# Patient Record
Sex: FEMALE | Race: WHITE
[De-identification: names, ages, dates, MRNs, and addresses within clinical notes are randomized per-mention and may not be internally consistent; named-entity substitution may affect disease eponyms.]

---

## 2019-05-24 ENCOUNTER — RESULT REVIEW (OUTPATIENT)
Age: 69
End: 2019-05-24

## 2020-12-30 ENCOUNTER — TRANSCRIPTION ENCOUNTER (OUTPATIENT)
Age: 70
End: 2020-12-30

## 2021-08-13 ENCOUNTER — RESULT REVIEW (OUTPATIENT)
Age: 71
End: 2021-08-13

## 2021-10-18 PROBLEM — Z00.00 ENCOUNTER FOR PREVENTIVE HEALTH EXAMINATION: Status: ACTIVE | Noted: 2021-10-18

## 2021-10-19 ENCOUNTER — APPOINTMENT (OUTPATIENT)
Dept: OTOLARYNGOLOGY | Facility: CLINIC | Age: 71
End: 2021-10-19
Payer: MEDICARE

## 2021-10-19 VITALS
BODY MASS INDEX: 30.73 KG/M2 | DIASTOLIC BLOOD PRESSURE: 84 MMHG | HEART RATE: 82 BPM | SYSTOLIC BLOOD PRESSURE: 149 MMHG | WEIGHT: 180 LBS | HEIGHT: 64 IN

## 2021-10-19 DIAGNOSIS — F17.200 NICOTINE DEPENDENCE, UNSPECIFIED, UNCOMPLICATED: ICD-10-CM

## 2021-10-19 DIAGNOSIS — Z78.9 OTHER SPECIFIED HEALTH STATUS: ICD-10-CM

## 2021-10-19 PROCEDURE — 99213 OFFICE O/P EST LOW 20 MIN: CPT | Mod: 25

## 2021-10-19 PROCEDURE — 69210 REMOVE IMPACTED EAR WAX UNI: CPT

## 2021-10-19 NOTE — HISTORY OF PRESENT ILLNESS
[de-identified] : Eleanor Lilly is a 71 yo female who presents with left ear fullness. She has previously been seen here for cerumen impaction. She notes diminished subjective hearing from the left. She denies otalgia, otorrhea, tinnitus, vertigo, or recent ear infections. She denies fevers or chills. She denies nasal congestion, rhinorrhea, or postnasal drainage.

## 2021-10-19 NOTE — PHYSICAL EXAM
[de-identified] : Bilateral cerumen impaction (see procedure). Once cerumen removed, bilateral EAC wnl [] : septum deviated to the left [Midline] : trachea located in midline position [Normal] : no rashes

## 2021-10-19 NOTE — ASSESSMENT
[FreeTextEntry1] : Eleanor Lilly presents with subjective diminished hearing from the left ear in the setting of bilateral cerumen impaction. Once cerumen was removed, hearing returned back to baseline and aural fullness resolved.\par \par - Follow up as needed.

## 2022-03-29 ENCOUNTER — APPOINTMENT (OUTPATIENT)
Dept: OTOLARYNGOLOGY | Facility: CLINIC | Age: 72
End: 2022-03-29
Payer: MEDICARE

## 2022-03-29 VITALS
SYSTOLIC BLOOD PRESSURE: 158 MMHG | HEIGHT: 64 IN | DIASTOLIC BLOOD PRESSURE: 93 MMHG | BODY MASS INDEX: 31.58 KG/M2 | WEIGHT: 185 LBS | HEART RATE: 91 BPM

## 2022-03-29 DIAGNOSIS — H93.292 OTHER ABNORMAL AUDITORY PERCEPTIONS, LEFT EAR: ICD-10-CM

## 2022-03-29 PROCEDURE — 99212 OFFICE O/P EST SF 10 MIN: CPT | Mod: 25

## 2022-03-29 PROCEDURE — G0268 REMOVAL OF IMPACTED WAX MD: CPT

## 2022-03-29 NOTE — HISTORY OF PRESENT ILLNESS
[de-identified] : Eleanor Lilly is a 71 yo female who presents with left ear fullness. She has previously been seen here for cerumen impaction. She notes diminished subjective hearing from the left. She denies otalgia, otorrhea, tinnitus, vertigo, or recent ear infections. She denies fevers or chills. She denies nasal congestion, rhinorrhea, or postnasal drainage. [FreeTextEntry1] : 3/29/22 -  Ms. Lilly presents for follow-up. She notes that her left ear feels full and has some diminished hearing. She denies otalgia, otorrhea, tinnitus, vertigo, or recent ear infections. She denies fevers or chills.

## 2022-03-29 NOTE — ASSESSMENT
[FreeTextEntry1] : Eleanor Lilly presents for evaluation of left aural fullness and diminished hearing. She was found to have bilateral cerumen impaction which was removed. She notes return of hearing to baselien and resolution of fullness. Offered audiogram, but patient would like to wait another three months prior to obtaining this.\par \par In addition, she has a dark subcentimeter lesion of her central lower lip. She states that she is seeing a dermatologist for this soon.\par \par Follow up in 3 months with audiogram at that time.

## 2022-03-29 NOTE — PHYSICAL EXAM
[de-identified] : Bilateral cerumen impaction (see procedure). Once cerumen removed, bilateral EAC wnl [] : septum deviated to the left [Midline] : trachea located in midline position [de-identified] : Dark subcentimeter mole of central lower lip [Normal] : no rashes

## 2022-06-24 ENCOUNTER — APPOINTMENT (OUTPATIENT)
Dept: OTOLARYNGOLOGY | Facility: CLINIC | Age: 72
End: 2022-06-24
Payer: MEDICARE

## 2022-06-24 VITALS
BODY MASS INDEX: 29.02 KG/M2 | HEART RATE: 84 BPM | HEIGHT: 64 IN | DIASTOLIC BLOOD PRESSURE: 82 MMHG | WEIGHT: 170 LBS | SYSTOLIC BLOOD PRESSURE: 146 MMHG

## 2022-06-24 DIAGNOSIS — H93.293 OTHER ABNORMAL AUDITORY PERCEPTIONS, BILATERAL: ICD-10-CM

## 2022-06-24 PROCEDURE — 92567 TYMPANOMETRY: CPT

## 2022-06-24 PROCEDURE — 92557 COMPREHENSIVE HEARING TEST: CPT

## 2022-06-24 PROCEDURE — 99213 OFFICE O/P EST LOW 20 MIN: CPT

## 2022-06-24 NOTE — DATA REVIEWED
[de-identified] : Type  A tymps AU\par WNL, 250-2000 Hz, mild SNHL, 6107-2272 Hz, AU\par REC: ENT f/u, re-eval per MD

## 2022-06-24 NOTE — PHYSICAL EXAM
[] : septum deviated to the left [Midline] : trachea located in midline position [Normal] : no rashes [de-identified] : Mild bilateral cerumen removed. [de-identified] : Dark subcentimeter mole of central lower lip

## 2022-06-24 NOTE — HISTORY OF PRESENT ILLNESS
[de-identified] : Eleanor Lilly is a 71 yo female who presents with left ear fullness. She has previously been seen here for cerumen impaction. She notes diminished subjective hearing from the left. She denies otalgia, otorrhea, tinnitus, vertigo, or recent ear infections. She denies fevers or chills. She denies nasal congestion, rhinorrhea, or postnasal drainage. [FreeTextEntry1] : 3/29/22 -  Ms. Lilly presents for follow-up. She notes that her left ear feels full and has some diminished hearing. She denies otalgia, otorrhea, tinnitus, vertigo, or recent ear infections. She denies fevers or chills.\par \par 6/24/22 - Ms. Lilly presents for follow-up. She notes mild left aural fullness. She notes that her  believes she speaks loudly and there is some concern for hearing loss. She denies tinnitus or vertigo. She denies facial weakness, facial numbness, or recurrent ear infections. She denies recent fevers or chills. She denies family history of early onset hearing loss or occupational noise exposure. She denies exposure to ototoxic medications. She has seen a dermatologist for her lower lip discolored spot, who will be monitoring it but does not believe it requires further intervention.

## 2022-09-23 ENCOUNTER — APPOINTMENT (OUTPATIENT)
Dept: OTOLARYNGOLOGY | Facility: CLINIC | Age: 72
End: 2022-09-23

## 2022-09-23 VITALS
HEART RATE: 76 BPM | WEIGHT: 180 LBS | DIASTOLIC BLOOD PRESSURE: 83 MMHG | HEIGHT: 63 IN | BODY MASS INDEX: 31.89 KG/M2 | SYSTOLIC BLOOD PRESSURE: 126 MMHG

## 2022-09-23 PROCEDURE — G0268 REMOVAL OF IMPACTED WAX MD: CPT

## 2022-09-23 PROCEDURE — 99212 OFFICE O/P EST SF 10 MIN: CPT | Mod: 25

## 2022-09-23 NOTE — ASSESSMENT
[FreeTextEntry1] : Eleanor Lilly presents for follow-up. She has bilateral cerumen impaction which was removed. She has known mild SNHL AU seen on previous audiogram. Will repeat audio in 9 months.\par \par - Follow up in 3 months for cerumen removal.

## 2022-09-23 NOTE — HISTORY OF PRESENT ILLNESS
[de-identified] : Eleanor Lilly is a 69 yo female who presents with left ear fullness. She has previously been seen here for cerumen impaction. She notes diminished subjective hearing from the left. She denies otalgia, otorrhea, tinnitus, vertigo, or recent ear infections. She denies fevers or chills. She denies nasal congestion, rhinorrhea, or postnasal drainage. [FreeTextEntry1] : 3/29/22 -  Ms. Lilly presents for follow-up. She notes that her left ear feels full and has some diminished hearing. She denies otalgia, otorrhea, tinnitus, vertigo, or recent ear infections. She denies fevers or chills.\par \par 6/24/22 - Ms. Lilly presents for follow-up. She notes mild left aural fullness. She notes that her  believes she speaks loudly and there is some concern for hearing loss. She denies tinnitus or vertigo. She denies facial weakness, facial numbness, or recurrent ear infections. She denies recent fevers or chills. She denies family history of early onset hearing loss or occupational noise exposure. She denies exposure to ototoxic medications. She has seen a dermatologist for her lower lip discolored spot, who will be monitoring it but does not believe it requires further intervention.\par \par 9/23/22 - Eleanor Lilly presents for follow-up. She has mild SNHL AU seen on audio at last visit. She denies otalgia, otorrhea, tinnitus, vertigo, or hearing change. She denies fevers, chills, or recent ear infections.

## 2022-09-23 NOTE — PHYSICAL EXAM
[de-identified] : Bilateral cerumen impaction [] : septum deviated to the left [Midline] : trachea located in midline position [de-identified] : Dark subcentimeter mole of central lower lip [Normal] : no rashes

## 2022-10-19 ENCOUNTER — NON-APPOINTMENT (OUTPATIENT)
Age: 72
End: 2022-10-19

## 2022-10-19 DIAGNOSIS — Z82.49 FAMILY HISTORY OF ISCHEMIC HEART DISEASE AND OTHER DISEASES OF THE CIRCULATORY SYSTEM: ICD-10-CM

## 2022-10-19 DIAGNOSIS — G43.909 MIGRAINE, UNSPECIFIED, NOT INTRACTABLE, W/OUT STATUS MIGRAINOSUS: ICD-10-CM

## 2022-10-19 DIAGNOSIS — Z92.89 PERSONAL HISTORY OF OTHER MEDICAL TREATMENT: ICD-10-CM

## 2022-10-19 DIAGNOSIS — Z86.69 PERSONAL HISTORY OF OTHER DISEASES OF THE NERVOUS SYSTEM AND SENSE ORGANS: ICD-10-CM

## 2022-10-19 RX ORDER — MULTIVITAMIN
TABLET ORAL
Refills: 0 | Status: ACTIVE | COMMUNITY

## 2022-12-12 ENCOUNTER — APPOINTMENT (OUTPATIENT)
Dept: CARDIOLOGY | Facility: CLINIC | Age: 72
End: 2022-12-12

## 2022-12-12 ENCOUNTER — TRANSCRIPTION ENCOUNTER (OUTPATIENT)
Age: 72
End: 2022-12-12

## 2022-12-12 VITALS
SYSTOLIC BLOOD PRESSURE: 138 MMHG | WEIGHT: 170 LBS | HEART RATE: 81 BPM | DIASTOLIC BLOOD PRESSURE: 83 MMHG | OXYGEN SATURATION: 98 % | HEIGHT: 63 IN | TEMPERATURE: 98.9 F | BODY MASS INDEX: 30.12 KG/M2

## 2022-12-12 PROCEDURE — 99213 OFFICE O/P EST LOW 20 MIN: CPT

## 2022-12-12 NOTE — ASSESSMENT
[FreeTextEntry1] : 72 years old female with hypertension dyslipidemia comes here for follow-up. patient's medications reviewed patient will continue previous medication no changes are made. patient will have blood work done next visit

## 2022-12-16 ENCOUNTER — APPOINTMENT (OUTPATIENT)
Dept: OTOLARYNGOLOGY | Facility: CLINIC | Age: 72
End: 2022-12-16

## 2022-12-16 VITALS
BODY MASS INDEX: 29.02 KG/M2 | HEIGHT: 64 IN | WEIGHT: 170 LBS | SYSTOLIC BLOOD PRESSURE: 137 MMHG | DIASTOLIC BLOOD PRESSURE: 86 MMHG | HEART RATE: 81 BPM

## 2022-12-16 DIAGNOSIS — H61.22 IMPACTED CERUMEN, LEFT EAR: ICD-10-CM

## 2022-12-16 PROCEDURE — 99212 OFFICE O/P EST SF 10 MIN: CPT | Mod: 25

## 2022-12-16 PROCEDURE — 69210 REMOVE IMPACTED EAR WAX UNI: CPT

## 2022-12-16 NOTE — HISTORY OF PRESENT ILLNESS
[de-identified] : Eleanor Lilly is a 69 yo female who presents with left ear fullness. She has previously been seen here for cerumen impaction. She notes diminished subjective hearing from the left. She denies otalgia, otorrhea, tinnitus, vertigo, or recent ear infections. She denies fevers or chills. She denies nasal congestion, rhinorrhea, or postnasal drainage. [FreeTextEntry1] : 3/29/22 -  Ms. Lilly presents for follow-up. She notes that her left ear feels full and has some diminished hearing. She denies otalgia, otorrhea, tinnitus, vertigo, or recent ear infections. She denies fevers or chills.\par \par 6/24/22 - Ms. Lilly presents for follow-up. She notes mild left aural fullness. She notes that her  believes she speaks loudly and there is some concern for hearing loss. She denies tinnitus or vertigo. She denies facial weakness, facial numbness, or recurrent ear infections. She denies recent fevers or chills. She denies family history of early onset hearing loss or occupational noise exposure. She denies exposure to ototoxic medications. She has seen a dermatologist for her lower lip discolored spot, who will be monitoring it but does not believe it requires further intervention.\par \par 9/23/22 - Eleanor Lilly presents for follow-up. She has mild SNHL AU seen on audio at last visit. She denies otalgia, otorrhea, tinnitus, vertigo, or hearing change. She denies fevers, chills, or recent ear infections.\par \par 12/16/22 - Ms. Lilly presents for follow-up. She has left aural fullness. She denies otalgia, otorrhea, tinnitus, vertigo. She denies hearing change. She denies fevers, chills, or recent ear infections. She has known bilateral sensorineural hearing loss.

## 2022-12-16 NOTE — ASSESSMENT
[FreeTextEntry1] : Eleanor Lilly presents for follow-up. She has left cerumen impaction which was removed. She has known mild SNHL AU seen on previous audiogram. Will repeat audio in 6 months.\par \par - Follow up in 6 months with audio at that time.

## 2022-12-16 NOTE — PHYSICAL EXAM
[de-identified] : Left cerumen impaction. [] : septum deviated to the left [Midline] : trachea located in midline position [de-identified] : Dark subcentimeter mole of central lower lip, stable. [Normal] : no rashes

## 2023-03-13 ENCOUNTER — APPOINTMENT (OUTPATIENT)
Dept: CARDIOLOGY | Facility: CLINIC | Age: 73
End: 2023-03-13
Payer: MEDICARE

## 2023-03-13 VITALS
HEART RATE: 69 BPM | TEMPERATURE: 97.5 F | DIASTOLIC BLOOD PRESSURE: 90 MMHG | WEIGHT: 168 LBS | SYSTOLIC BLOOD PRESSURE: 160 MMHG | HEIGHT: 63 IN | OXYGEN SATURATION: 94 % | BODY MASS INDEX: 29.77 KG/M2

## 2023-03-13 PROCEDURE — 36415 COLL VENOUS BLD VENIPUNCTURE: CPT

## 2023-03-13 PROCEDURE — 99213 OFFICE O/P EST LOW 20 MIN: CPT | Mod: 25

## 2023-03-13 RX ORDER — ALENDRONATE SODIUM 70 MG/1
70 TABLET ORAL
Qty: 12 | Refills: 0 | Status: DISCONTINUED | COMMUNITY
Start: 2022-03-24 | End: 2023-03-13

## 2023-03-13 RX ORDER — TOPIRAMATE 50 MG/1
50 TABLET, FILM COATED ORAL
Qty: 180 | Refills: 0 | Status: DISCONTINUED | COMMUNITY
Start: 2021-08-31 | End: 2023-03-13

## 2023-03-13 NOTE — ASSESSMENT
[FreeTextEntry1] : Patient is noted to have mild hypertension–BP taken by me was 150/90-if BP remains elevated patient may need further adjustment of antihypertensive medication.  This was all discussed with the patient. patient will have lipids liver function CPK drawn today

## 2023-03-13 NOTE — REASON FOR VISIT
[Hyperlipidemia] : hyperlipidemia [Hypertension] : hypertension [FreeTextEntry1] : 72 years old female with hypertension, dyslipidemia mild aortic stenosis comes to the office for routine follow-up.  Denies any dizziness, no syncope

## 2023-03-16 LAB
ALBUMIN SERPL ELPH-MCNC: 5.1 G/DL
ALP BLD-CCNC: 80 U/L
ALT SERPL-CCNC: 14 U/L
AST SERPL-CCNC: 20 U/L
BILIRUB DIRECT SERPL-MCNC: 0.2 MG/DL
BILIRUB INDIRECT SERPL-MCNC: 0.3 MG/DL
BILIRUB SERPL-MCNC: 0.5 MG/DL
CHOLEST SERPL-MCNC: 195 MG/DL
CK SERPL-CCNC: 91 U/L
HDLC SERPL-MCNC: 95 MG/DL
LDLC SERPL CALC-MCNC: 83 MG/DL
NONHDLC SERPL-MCNC: 99 MG/DL
PROT SERPL-MCNC: 7.2 G/DL
TRIGL SERPL-MCNC: 78 MG/DL

## 2023-03-17 ENCOUNTER — NON-APPOINTMENT (OUTPATIENT)
Age: 73
End: 2023-03-17

## 2023-05-05 ENCOUNTER — RX RENEWAL (OUTPATIENT)
Age: 73
End: 2023-05-05

## 2023-06-05 ENCOUNTER — APPOINTMENT (OUTPATIENT)
Dept: CARDIOLOGY | Facility: CLINIC | Age: 73
End: 2023-06-05
Payer: MEDICARE

## 2023-06-05 VITALS
HEART RATE: 68 BPM | DIASTOLIC BLOOD PRESSURE: 83 MMHG | HEIGHT: 63 IN | SYSTOLIC BLOOD PRESSURE: 130 MMHG | WEIGHT: 156.5 LBS | BODY MASS INDEX: 27.73 KG/M2 | OXYGEN SATURATION: 95 %

## 2023-06-05 PROCEDURE — 99213 OFFICE O/P EST LOW 20 MIN: CPT

## 2023-06-05 NOTE — REASON FOR VISIT
[Hyperlipidemia] : hyperlipidemia [Hypertension] : hypertension [FreeTextEntry1] : 72 years old female with hypertension dyslipidemia comes to the office for routine follow-up

## 2023-06-05 NOTE — ASSESSMENT
[FreeTextEntry1] : Patient's medications reviewed. patient will continue previous medications no changes are made

## 2023-06-16 ENCOUNTER — APPOINTMENT (OUTPATIENT)
Dept: OTOLARYNGOLOGY | Facility: CLINIC | Age: 73
End: 2023-06-16
Payer: MEDICARE

## 2023-06-16 VITALS
DIASTOLIC BLOOD PRESSURE: 85 MMHG | HEART RATE: 79 BPM | BODY MASS INDEX: 27.46 KG/M2 | WEIGHT: 155 LBS | SYSTOLIC BLOOD PRESSURE: 144 MMHG | HEIGHT: 63 IN

## 2023-06-16 PROCEDURE — 99213 OFFICE O/P EST LOW 20 MIN: CPT | Mod: 25

## 2023-06-16 PROCEDURE — 92557 COMPREHENSIVE HEARING TEST: CPT

## 2023-06-16 PROCEDURE — 92567 TYMPANOMETRY: CPT

## 2023-06-16 PROCEDURE — G0268 REMOVAL OF IMPACTED WAX MD: CPT

## 2023-06-16 NOTE — PHYSICAL EXAM
[] : septum deviated to the left [Midline] : trachea located in midline position [Normal] : no rashes [de-identified] : Bilateral cerumen impaction [de-identified] : Dark subcentimeter mole of central lower lip, stable.

## 2023-06-16 NOTE — HISTORY OF PRESENT ILLNESS
[de-identified] : Eleanor Lilly is a 71 yo female who presents with left ear fullness. She has previously been seen here for cerumen impaction. She notes diminished subjective hearing from the left. She denies otalgia, otorrhea, tinnitus, vertigo, or recent ear infections. She denies fevers or chills. She denies nasal congestion, rhinorrhea, or postnasal drainage. [FreeTextEntry1] : 3/29/22 -  Ms. Lilly presents for follow-up. She notes that her left ear feels full and has some diminished hearing. She denies otalgia, otorrhea, tinnitus, vertigo, or recent ear infections. She denies fevers or chills.\par \par 6/24/22 - Ms. Lilly presents for follow-up. She notes mild left aural fullness. She notes that her  believes she speaks loudly and there is some concern for hearing loss. She denies tinnitus or vertigo. She denies facial weakness, facial numbness, or recurrent ear infections. She denies recent fevers or chills. She denies family history of early onset hearing loss or occupational noise exposure. She denies exposure to ototoxic medications. She has seen a dermatologist for her lower lip discolored spot, who will be monitoring it but does not believe it requires further intervention.\par \par 9/23/22 - Eleanor Lilly presents for follow-up. She has mild SNHL AU seen on audio at last visit. She denies otalgia, otorrhea, tinnitus, vertigo, or hearing change. She denies fevers, chills, or recent ear infections.\par \par 12/16/22 - Ms. Lilly presents for follow-up. She has left aural fullness. She denies otalgia, otorrhea, tinnitus, vertigo. She denies hearing change. She denies fevers, chills, or recent ear infections. She has known bilateral sensorineural hearing loss.\par \par 6/16/23 - Eleanor Lilly presents for follow-up. She denies change in hearing. She denies tinnitus, vertigo, otalgia, otorrhea, or recent eari nfections. She denies fevers or chills. Patient saw dermatologist and notes that everything was normal.

## 2023-06-16 NOTE — ASSESSMENT
[FreeTextEntry1] : Eleanor Lilly presents for follow-up. Bilateral cerumen impaction was removed today. Audiogram was performed and reviewed showing type A tymps AU, and normal sloping to mild high frequency hearing loss AU. Will continue to monitor hearing. \par \par - Follow up in 1 year with audio.

## 2023-08-28 ENCOUNTER — RX RENEWAL (OUTPATIENT)
Age: 73
End: 2023-08-28

## 2023-09-22 ENCOUNTER — APPOINTMENT (OUTPATIENT)
Dept: CARDIOLOGY | Facility: CLINIC | Age: 73
End: 2023-09-22
Payer: MEDICARE

## 2023-09-22 VITALS
BODY MASS INDEX: 26.58 KG/M2 | WEIGHT: 150 LBS | SYSTOLIC BLOOD PRESSURE: 159 MMHG | HEIGHT: 63 IN | HEART RATE: 78 BPM | DIASTOLIC BLOOD PRESSURE: 85 MMHG | TEMPERATURE: 98.5 F | RESPIRATION RATE: 18 BRPM | OXYGEN SATURATION: 96 %

## 2023-09-22 PROCEDURE — 99213 OFFICE O/P EST LOW 20 MIN: CPT

## 2023-09-22 RX ORDER — ATORVASTATIN CALCIUM 80 MG/1
TABLET, FILM COATED ORAL
Refills: 0 | Status: DISCONTINUED | COMMUNITY
End: 2023-09-22

## 2023-09-22 RX ORDER — LOSARTAN POTASSIUM 100 MG/1
100 TABLET, FILM COATED ORAL DAILY
Qty: 90 | Refills: 1 | Status: DISCONTINUED | COMMUNITY
End: 2023-09-22

## 2023-09-27 LAB
ALBUMIN SERPL ELPH-MCNC: 4.9 G/DL
ALP BLD-CCNC: 87 U/L
ALT SERPL-CCNC: 15 U/L
AST SERPL-CCNC: 19 U/L
BILIRUB DIRECT SERPL-MCNC: 0.2 MG/DL
BILIRUB INDIRECT SERPL-MCNC: 0.6 MG/DL
BILIRUB SERPL-MCNC: 0.8 MG/DL
CHOLEST SERPL-MCNC: 205 MG/DL
CK SERPL-CCNC: 105 U/L
HDLC SERPL-MCNC: 95 MG/DL
LDLC SERPL CALC-MCNC: 92 MG/DL
NONHDLC SERPL-MCNC: 110 MG/DL
PROT SERPL-MCNC: 7.1 G/DL
TRIGL SERPL-MCNC: 104 MG/DL

## 2023-12-01 ENCOUNTER — APPOINTMENT (OUTPATIENT)
Dept: CARDIOLOGY | Facility: CLINIC | Age: 73
End: 2023-12-01
Payer: MEDICARE

## 2023-12-01 VITALS
BODY MASS INDEX: 27.46 KG/M2 | HEART RATE: 75 BPM | SYSTOLIC BLOOD PRESSURE: 148 MMHG | DIASTOLIC BLOOD PRESSURE: 83 MMHG | HEIGHT: 63 IN | TEMPERATURE: 99.3 F | WEIGHT: 155 LBS | OXYGEN SATURATION: 98 %

## 2023-12-01 DIAGNOSIS — J40 BRONCHITIS, NOT SPECIFIED AS ACUTE OR CHRONIC: ICD-10-CM

## 2023-12-01 PROCEDURE — 99213 OFFICE O/P EST LOW 20 MIN: CPT

## 2023-12-04 ENCOUNTER — RX RENEWAL (OUTPATIENT)
Age: 73
End: 2023-12-04

## 2023-12-22 ENCOUNTER — APPOINTMENT (OUTPATIENT)
Dept: CARDIOLOGY | Facility: CLINIC | Age: 73
End: 2023-12-22
Payer: MEDICARE

## 2023-12-22 VITALS
DIASTOLIC BLOOD PRESSURE: 86 MMHG | HEART RATE: 86 BPM | TEMPERATURE: 98.6 F | HEIGHT: 63 IN | OXYGEN SATURATION: 98 % | BODY MASS INDEX: 27.46 KG/M2 | SYSTOLIC BLOOD PRESSURE: 138 MMHG | WEIGHT: 155 LBS

## 2023-12-22 PROCEDURE — 36415 COLL VENOUS BLD VENIPUNCTURE: CPT

## 2023-12-22 PROCEDURE — 99213 OFFICE O/P EST LOW 20 MIN: CPT | Mod: 25

## 2023-12-22 NOTE — ASSESSMENT
[FreeTextEntry1] : Patient's medications reviewed patient will continue present medication no changes are made.  Patient had lipids liver function CPK drawn today

## 2023-12-22 NOTE — REASON FOR VISIT
[Hyperlipidemia] : hyperlipidemia [Hypertension] : hypertension [FreeTextEntry1] : 73 years old female with hypertension dyslipidemia comes to the office for follow-up

## 2023-12-26 LAB
ALBUMIN SERPL ELPH-MCNC: 5.1 G/DL
ALP BLD-CCNC: 79 U/L
ALT SERPL-CCNC: 6 U/L
AST SERPL-CCNC: 17 U/L
BILIRUB DIRECT SERPL-MCNC: 0.2 MG/DL
BILIRUB INDIRECT SERPL-MCNC: 0.2 MG/DL
BILIRUB SERPL-MCNC: 0.4 MG/DL
CHOLEST SERPL-MCNC: 194 MG/DL
CK SERPL-CCNC: 102 U/L
HDLC SERPL-MCNC: 99 MG/DL
LDLC SERPL CALC-MCNC: 61 MG/DL
NONHDLC SERPL-MCNC: 95 MG/DL
PROT SERPL-MCNC: 7.2 G/DL
TRIGL SERPL-MCNC: 222 MG/DL

## 2024-01-30 ENCOUNTER — RX RENEWAL (OUTPATIENT)
Age: 74
End: 2024-01-30

## 2024-01-30 RX ORDER — ATORVASTATIN CALCIUM 10 MG/1
10 TABLET, FILM COATED ORAL
Qty: 90 | Refills: 3 | Status: ACTIVE | COMMUNITY
Start: 2023-05-05 | End: 1900-01-01

## 2024-03-22 ENCOUNTER — APPOINTMENT (OUTPATIENT)
Dept: CARDIOLOGY | Facility: CLINIC | Age: 74
End: 2024-03-22
Payer: MEDICARE

## 2024-03-22 VITALS
BODY MASS INDEX: 27.46 KG/M2 | TEMPERATURE: 99 F | DIASTOLIC BLOOD PRESSURE: 88 MMHG | RESPIRATION RATE: 18 BRPM | WEIGHT: 155 LBS | HEART RATE: 78 BPM | HEIGHT: 63 IN | OXYGEN SATURATION: 98 % | SYSTOLIC BLOOD PRESSURE: 149 MMHG

## 2024-03-22 PROCEDURE — 99213 OFFICE O/P EST LOW 20 MIN: CPT

## 2024-03-22 RX ORDER — AMLODIPINE BESYLATE 5 MG/1
5 TABLET ORAL
Qty: 90 | Refills: 3 | Status: ACTIVE | COMMUNITY
Start: 1900-01-01 | End: 1900-01-01

## 2024-03-22 RX ORDER — LOSARTAN POTASSIUM 100 MG/1
100 TABLET, FILM COATED ORAL
Qty: 90 | Refills: 3 | Status: ACTIVE | COMMUNITY
Start: 2023-12-04

## 2024-03-22 NOTE — ASSESSMENT
[FreeTextEntry1] : Blood pressure taken by me was 140 /8- patient's medications reviewed -patient continue present medication no changes are made.  Patient will have blood work next visit

## 2024-03-22 NOTE — REASON FOR VISIT
[Hyperlipidemia] : hyperlipidemia [Hypertension] : hypertension [FreeTextEntry1] : 73 years old female with hypertension and dyslipidemia comes to the office for routine follow-up

## 2024-03-22 NOTE — PHYSICAL EXAM
[Well Developed] : well developed [Well Nourished] : well nourished [No Acute Distress] : no acute distress [Normal Venous Pressure] : normal venous pressure [Normal Conjunctiva] : normal conjunctiva [Normal S1, S2] : normal S1, S2 [No Carotid Bruit] : no carotid bruit [No Murmur] : no murmur [No Rub] : no rub [No Gallop] : no gallop [Clear Lung Fields] : clear lung fields [Good Air Entry] : good air entry [No Respiratory Distress] : no respiratory distress  [Soft] : abdomen soft [Non Tender] : non-tender [No Masses/organomegaly] : no masses/organomegaly [Normal Bowel Sounds] : normal bowel sounds [Normal Gait] : normal gait [No Edema] : no edema [No Cyanosis] : no cyanosis [No Clubbing] : no clubbing [No Varicosities] : no varicosities [No Rash] : no rash [No Skin Lesions] : no skin lesions [Moves all extremities] : moves all extremities [No Focal Deficits] : no focal deficits [Normal Speech] : normal speech [Alert and Oriented] : alert and oriented [Normal memory] : normal memory

## 2024-06-06 ENCOUNTER — NON-APPOINTMENT (OUTPATIENT)
Age: 74
End: 2024-06-06

## 2024-06-07 ENCOUNTER — APPOINTMENT (OUTPATIENT)
Dept: OTOLARYNGOLOGY | Facility: CLINIC | Age: 74
End: 2024-06-07
Payer: MEDICARE

## 2024-06-07 VITALS
SYSTOLIC BLOOD PRESSURE: 142 MMHG | HEIGHT: 63 IN | HEART RATE: 70 BPM | WEIGHT: 170 LBS | DIASTOLIC BLOOD PRESSURE: 88 MMHG | BODY MASS INDEX: 30.12 KG/M2

## 2024-06-07 DIAGNOSIS — H90.3 SENSORINEURAL HEARING LOSS, BILATERAL: ICD-10-CM

## 2024-06-07 DIAGNOSIS — H61.23 IMPACTED CERUMEN, BILATERAL: ICD-10-CM

## 2024-06-07 PROCEDURE — 92557 COMPREHENSIVE HEARING TEST: CPT

## 2024-06-07 PROCEDURE — G0268 REMOVAL OF IMPACTED WAX MD: CPT

## 2024-06-07 PROCEDURE — 92567 TYMPANOMETRY: CPT

## 2024-06-07 PROCEDURE — 99213 OFFICE O/P EST LOW 20 MIN: CPT | Mod: 25

## 2024-06-07 NOTE — DATA REVIEWED
[de-identified] : Audio 6/7/24: Type A tymps AU. Hearing wnl to moderate high frequency sensorineural hearing loss AU.

## 2024-06-07 NOTE — PHYSICAL EXAM
[] : septum deviated to the left [Midline] : trachea located in midline position [Normal] : no rashes [de-identified] : Bilateral cerumen impaction [de-identified] : Dark subcentimeter mole of central lower lip, stable.

## 2024-06-07 NOTE — HISTORY OF PRESENT ILLNESS
[de-identified] : Eleanor Lilly is a 69 yo female who presents with left ear fullness. She has previously been seen here for cerumen impaction. She notes diminished subjective hearing from the left. She denies otalgia, otorrhea, tinnitus, vertigo, or recent ear infections. She denies fevers or chills. She denies nasal congestion, rhinorrhea, or postnasal drainage. [FreeTextEntry1] : 3/29/22 -  Ms. Lilly presents for follow-up. She notes that her left ear feels full and has some diminished hearing. She denies otalgia, otorrhea, tinnitus, vertigo, or recent ear infections. She denies fevers or chills.  6/24/22 - Ms. Lilly presents for follow-up. She notes mild left aural fullness. She notes that her  believes she speaks loudly and there is some concern for hearing loss. She denies tinnitus or vertigo. She denies facial weakness, facial numbness, or recurrent ear infections. She denies recent fevers or chills. She denies family history of early onset hearing loss or occupational noise exposure. She denies exposure to ototoxic medications. She has seen a dermatologist for her lower lip discolored spot, who will be monitoring it but does not believe it requires further intervention.  9/23/22 - Eleanor Lilly presents for follow-up. She has mild SNHL AU seen on audio at last visit. She denies otalgia, otorrhea, tinnitus, vertigo, or hearing change. She denies fevers, chills, or recent ear infections.  12/16/22 - Ms. Lilly presents for follow-up. She has left aural fullness. She denies otalgia, otorrhea, tinnitus, vertigo. She denies hearing change. She denies fevers, chills, or recent ear infections. She has known bilateral sensorineural hearing loss.  6/16/23 - Eleanor Lilly presents for follow-up. She denies change in hearing. She denies tinnitus, vertigo, otalgia, otorrhea, or recent eari nfections. She denies fevers or chills. Patient saw dermatologist and notes that everything was normal.  6/7/24 - Eleanor Lilly presents for follow-up. She denies hearing change. She denies otalgia, otorrhea, or recent ear infections. She denies tinntius or vertigo. No recent fevers.

## 2024-06-07 NOTE — ASSESSMENT
[FreeTextEntry1] : Eleanor Lilly presents for follow-up of hearing. Bilateral cerumen impaction was removed today. Otoscopic exam is normal. Audiogram was performed and reviewed showing type A tymps AU and hearing wnl to moderate high frequency sensorineural hearing loss AU, essentially stable from prior. will continue to monitor hearing.  - Follow up in 1 year with audio.

## 2024-06-13 ENCOUNTER — APPOINTMENT (OUTPATIENT)
Dept: CARDIOLOGY | Facility: CLINIC | Age: 74
End: 2024-06-13
Payer: MEDICARE

## 2024-06-13 VITALS
HEART RATE: 78 BPM | WEIGHT: 170 LBS | SYSTOLIC BLOOD PRESSURE: 156 MMHG | OXYGEN SATURATION: 97 % | DIASTOLIC BLOOD PRESSURE: 90 MMHG | BODY MASS INDEX: 30.12 KG/M2 | TEMPERATURE: 97.9 F | HEIGHT: 63 IN

## 2024-06-13 DIAGNOSIS — I35.0 NONRHEUMATIC AORTIC (VALVE) STENOSIS: ICD-10-CM

## 2024-06-13 DIAGNOSIS — E78.5 HYPERLIPIDEMIA, UNSPECIFIED: ICD-10-CM

## 2024-06-13 DIAGNOSIS — M06.9 RHEUMATOID ARTHRITIS, UNSPECIFIED: ICD-10-CM

## 2024-06-13 DIAGNOSIS — I10 ESSENTIAL (PRIMARY) HYPERTENSION: ICD-10-CM

## 2024-06-13 PROCEDURE — 99213 OFFICE O/P EST LOW 20 MIN: CPT

## 2024-06-13 RX ORDER — IBUPROFEN 800 MG/1
800 TABLET, FILM COATED ORAL
Refills: 0 | Status: DISCONTINUED | COMMUNITY
End: 2024-06-13

## 2024-06-13 RX ORDER — AZITHROMYCIN 250 MG/1
250 TABLET, FILM COATED ORAL
Qty: 1 | Refills: 1 | Status: DISCONTINUED | COMMUNITY
Start: 2023-11-13 | End: 2024-06-13

## 2024-06-13 RX ORDER — CHLORHEXIDINE GLUCONATE 4 G/100ML
SOLUTION TOPICAL
Refills: 0 | Status: DISCONTINUED | COMMUNITY
End: 2024-06-13

## 2024-06-13 NOTE — ASSESSMENT
[FreeTextEntry1] : Blood pressure is little elevated-however patient did not take antihypertensive medication.  Patient will be seen in 6 weeks for further evaluation.  If blood pressure still elevated patient will need adjustment of antihypertensive medication.  This was all discussed with the patient

## 2024-07-15 ENCOUNTER — APPOINTMENT (OUTPATIENT)
Dept: CARDIOLOGY | Facility: CLINIC | Age: 74
End: 2024-07-15
Payer: MEDICARE

## 2024-07-15 VITALS
SYSTOLIC BLOOD PRESSURE: 140 MMHG | TEMPERATURE: 99.2 F | OXYGEN SATURATION: 98 % | WEIGHT: 170 LBS | DIASTOLIC BLOOD PRESSURE: 100 MMHG | HEART RATE: 60 BPM | HEIGHT: 60 IN | BODY MASS INDEX: 33.38 KG/M2

## 2024-07-15 DIAGNOSIS — I10 ESSENTIAL (PRIMARY) HYPERTENSION: ICD-10-CM

## 2024-07-15 DIAGNOSIS — E78.5 HYPERLIPIDEMIA, UNSPECIFIED: ICD-10-CM

## 2024-07-15 PROCEDURE — 99213 OFFICE O/P EST LOW 20 MIN: CPT

## 2024-10-01 ENCOUNTER — APPOINTMENT (OUTPATIENT)
Dept: CARDIOLOGY | Facility: CLINIC | Age: 74
End: 2024-10-01
Payer: MEDICARE

## 2024-10-01 VITALS
HEIGHT: 64 IN | BODY MASS INDEX: 29.02 KG/M2 | WEIGHT: 170 LBS | TEMPERATURE: 98.2 F | HEART RATE: 86 BPM | OXYGEN SATURATION: 98 % | DIASTOLIC BLOOD PRESSURE: 89 MMHG | SYSTOLIC BLOOD PRESSURE: 140 MMHG

## 2024-10-01 DIAGNOSIS — E78.5 HYPERLIPIDEMIA, UNSPECIFIED: ICD-10-CM

## 2024-10-01 DIAGNOSIS — I10 ESSENTIAL (PRIMARY) HYPERTENSION: ICD-10-CM

## 2024-10-01 PROCEDURE — 99213 OFFICE O/P EST LOW 20 MIN: CPT | Mod: 25

## 2024-10-01 PROCEDURE — 36415 COLL VENOUS BLD VENIPUNCTURE: CPT

## 2024-10-01 NOTE — REASON FOR VISIT
[Hyperlipidemia] : hyperlipidemia [Hypertension] : hypertension [FreeTextEntry1] : 73 years old female with hypertension dyslipidemia comes to the office for routine follow-up

## 2024-10-01 NOTE — ASSESSMENT
[FreeTextEntry1] : Patient's medications reviewed.  Patient continue present medication no changes are made.  Patient had lipids liver function CPK drawn today

## 2024-10-02 LAB
ALBUMIN SERPL ELPH-MCNC: 5.1 G/DL
ALP BLD-CCNC: 84 U/L
ALT SERPL-CCNC: 15 U/L
AST SERPL-CCNC: 20 U/L
BILIRUB DIRECT SERPL-MCNC: 0.2 MG/DL
BILIRUB INDIRECT SERPL-MCNC: 0.6 MG/DL
BILIRUB SERPL-MCNC: 0.8 MG/DL
CHOLEST SERPL-MCNC: 209 MG/DL
HDLC SERPL-MCNC: 80 MG/DL
LDLC SERPL CALC-MCNC: 108 MG/DL
NONHDLC SERPL-MCNC: 129 MG/DL
PROT SERPL-MCNC: 7.4 G/DL
TRIGL SERPL-MCNC: 125 MG/DL

## 2024-11-26 ENCOUNTER — RX RENEWAL (OUTPATIENT)
Age: 74
End: 2024-11-26

## 2025-01-02 ENCOUNTER — APPOINTMENT (OUTPATIENT)
Dept: CARDIOLOGY | Facility: CLINIC | Age: 75
End: 2025-01-02
Payer: MEDICARE

## 2025-01-02 VITALS
HEIGHT: 64 IN | BODY MASS INDEX: 29.02 KG/M2 | OXYGEN SATURATION: 97 % | DIASTOLIC BLOOD PRESSURE: 98 MMHG | WEIGHT: 170 LBS | TEMPERATURE: 98.1 F | SYSTOLIC BLOOD PRESSURE: 157 MMHG | HEART RATE: 92 BPM

## 2025-01-02 DIAGNOSIS — E78.5 HYPERLIPIDEMIA, UNSPECIFIED: ICD-10-CM

## 2025-01-02 DIAGNOSIS — I10 ESSENTIAL (PRIMARY) HYPERTENSION: ICD-10-CM

## 2025-01-02 PROCEDURE — 99213 OFFICE O/P EST LOW 20 MIN: CPT

## 2025-01-04 LAB
ALBUMIN SERPL ELPH-MCNC: 5.2 G/DL
ALP BLD-CCNC: 90 U/L
ALT SERPL-CCNC: 15 U/L
AST SERPL-CCNC: 21 U/L
BILIRUB DIRECT SERPL-MCNC: 0.2 MG/DL
BILIRUB INDIRECT SERPL-MCNC: 0.5 MG/DL
BILIRUB SERPL-MCNC: 0.7 MG/DL
CHOLEST SERPL-MCNC: 221 MG/DL
CK SERPL-CCNC: 126 U/L
HDLC SERPL-MCNC: 89 MG/DL
LDLC SERPL CALC-MCNC: 113 MG/DL
NONHDLC SERPL-MCNC: 131 MG/DL
PROT SERPL-MCNC: 7.6 G/DL
TRIGL SERPL-MCNC: 108 MG/DL

## 2025-04-01 ENCOUNTER — APPOINTMENT (OUTPATIENT)
Dept: CARDIOLOGY | Facility: CLINIC | Age: 75
End: 2025-04-01
Payer: MEDICARE

## 2025-04-01 VITALS
HEART RATE: 79 BPM | OXYGEN SATURATION: 97 % | BODY MASS INDEX: 29.02 KG/M2 | TEMPERATURE: 97.8 F | DIASTOLIC BLOOD PRESSURE: 90 MMHG | SYSTOLIC BLOOD PRESSURE: 160 MMHG | HEIGHT: 64 IN | WEIGHT: 170 LBS

## 2025-04-01 DIAGNOSIS — E78.5 HYPERLIPIDEMIA, UNSPECIFIED: ICD-10-CM

## 2025-04-01 DIAGNOSIS — I10 ESSENTIAL (PRIMARY) HYPERTENSION: ICD-10-CM

## 2025-04-01 PROCEDURE — 99213 OFFICE O/P EST LOW 20 MIN: CPT | Mod: 25

## 2025-04-01 PROCEDURE — 36415 COLL VENOUS BLD VENIPUNCTURE: CPT

## 2025-04-02 LAB
ALBUMIN SERPL ELPH-MCNC: 5.3 G/DL
ALP BLD-CCNC: 86 U/L
ALT SERPL-CCNC: 16 U/L
AST SERPL-CCNC: 22 U/L
BILIRUB DIRECT SERPL-MCNC: 0.2 MG/DL
BILIRUB INDIRECT SERPL-MCNC: 0.6 MG/DL
BILIRUB SERPL-MCNC: 0.9 MG/DL
CHOLEST SERPL-MCNC: 191 MG/DL
HDLC SERPL-MCNC: 89 MG/DL
LDLC SERPL-MCNC: 82 MG/DL
NONHDLC SERPL-MCNC: 102 MG/DL
PROT SERPL-MCNC: 7.3 G/DL
TRIGL SERPL-MCNC: 114 MG/DL

## 2025-05-13 ENCOUNTER — APPOINTMENT (OUTPATIENT)
Dept: CARDIOLOGY | Facility: CLINIC | Age: 75
End: 2025-05-13
Payer: MEDICARE

## 2025-05-13 VITALS
DIASTOLIC BLOOD PRESSURE: 87 MMHG | OXYGEN SATURATION: 99 % | WEIGHT: 170 LBS | HEART RATE: 89 BPM | HEIGHT: 64 IN | SYSTOLIC BLOOD PRESSURE: 134 MMHG | BODY MASS INDEX: 29.02 KG/M2 | TEMPERATURE: 97.1 F

## 2025-05-13 DIAGNOSIS — E78.5 HYPERLIPIDEMIA, UNSPECIFIED: ICD-10-CM

## 2025-05-13 DIAGNOSIS — I10 ESSENTIAL (PRIMARY) HYPERTENSION: ICD-10-CM

## 2025-05-13 PROCEDURE — 99213 OFFICE O/P EST LOW 20 MIN: CPT

## 2025-08-12 ENCOUNTER — APPOINTMENT (OUTPATIENT)
Dept: CARDIOLOGY | Facility: CLINIC | Age: 75
End: 2025-08-12
Payer: MEDICARE

## 2025-08-12 VITALS
OXYGEN SATURATION: 97 % | DIASTOLIC BLOOD PRESSURE: 76 MMHG | HEART RATE: 93 BPM | BODY MASS INDEX: 30.73 KG/M2 | HEIGHT: 64 IN | SYSTOLIC BLOOD PRESSURE: 110 MMHG | WEIGHT: 180 LBS

## 2025-08-12 DIAGNOSIS — E78.5 HYPERLIPIDEMIA, UNSPECIFIED: ICD-10-CM

## 2025-08-12 DIAGNOSIS — I10 ESSENTIAL (PRIMARY) HYPERTENSION: ICD-10-CM

## 2025-08-12 DIAGNOSIS — M06.9 RHEUMATOID ARTHRITIS, UNSPECIFIED: ICD-10-CM

## 2025-08-12 PROCEDURE — 99213 OFFICE O/P EST LOW 20 MIN: CPT

## 2025-08-12 RX ORDER — CHOLECALCIFEROL (VITAMIN D3) 25 MCG
25 MCG TABLET ORAL
Refills: 0 | Status: ACTIVE | COMMUNITY